# Patient Record
Sex: FEMALE | Race: WHITE
[De-identification: names, ages, dates, MRNs, and addresses within clinical notes are randomized per-mention and may not be internally consistent; named-entity substitution may affect disease eponyms.]

---

## 2017-06-28 ENCOUNTER — HOSPITAL ENCOUNTER (OUTPATIENT)
Dept: HOSPITAL 47 - RADUSWWP | Age: 52
Discharge: HOME | End: 2017-06-28
Payer: COMMERCIAL

## 2017-06-28 DIAGNOSIS — E04.2: Primary | ICD-10-CM

## 2017-06-28 PROCEDURE — 84439 ASSAY OF FREE THYROXINE: CPT

## 2017-06-28 PROCEDURE — 76536 US EXAM OF HEAD AND NECK: CPT

## 2017-06-28 PROCEDURE — 84443 ASSAY THYROID STIM HORMONE: CPT

## 2017-06-29 NOTE — US
EXAMINATION TYPE: US thyroid st tissue head/neck

 

DATE OF EXAM: 6/28/2017

 

COMPARISON: NONE

 

CLINICAL HISTORY: E04.2 thyroid goiter; left thyroid nodule per order; patient c/o sore throat

 

GLAND SIZE:

 

Right Lobe: 5.5 x 1.7 x 1.7 cm

** Overall Parenchyma:  homogenous

Left Lobe: 5.3 x 1.8 x 1.9 cm

** Overall Parenchyma:  homogeneous

Isthmus Thickness:  0.4 cm

 

NODULES

 

RIGHT:   # of nodules measured on right: 2

1.  0.4 x 0.3 x 0.2cm hypoechoic solid nodule at the upper pole with well-defined margins.  This nodu
le is wider than tall and shows no intranodular vascularity.

** Prior size: no prior US

2. 0.7 X 0.5  x 0.3 cm hypoechoic solid nodule at the mid pole with well-defined margins.  This nodul
e is wider than tall and shows no intranodular vascularity.

 

 

LEFT:    # of nodules measured on left:  0

 

ISTHMUS:    # of nodules measured in the isthmus:  0

 

Bilateral neck scanned, no evidence of lymphadenopathy.

 

IMPRESSION:

 

1. Subcentimeter right lobe thyroid nodules.

## 2019-01-15 ENCOUNTER — HOSPITAL ENCOUNTER (OUTPATIENT)
Dept: HOSPITAL 47 - RADMAMWWP | Age: 54
Discharge: HOME | End: 2019-01-15
Attending: OBSTETRICS & GYNECOLOGY
Payer: COMMERCIAL

## 2019-01-15 DIAGNOSIS — Z12.31: Primary | ICD-10-CM

## 2019-01-15 PROCEDURE — 77063 BREAST TOMOSYNTHESIS BI: CPT

## 2019-01-15 PROCEDURE — 77067 SCR MAMMO BI INCL CAD: CPT

## 2019-01-19 NOTE — MM
Reason for exam: screening  (asymptomatic).

Last mammogram was performed 2 years and 4 months ago.



History:

Patient is postmenopausal.

Took hormonal contraceptives for 14 years.



MG 3D Screening Mammo W/Cad

Bilateral CC and MLO view(s) were taken.

Prior study comparison: September 2, 2016, bilateral MG screening mammo w CAD.  

May 12, 2015, bilateral MG screening mammo w CAD.

The breast tissue is heterogeneously dense. This may lower the sensitivity of 

mammography.

There are benign-appearing round segmented calcifications in the right breast 

upper outer position.  No significant changes when compared with prior studies.





ASSESSMENT: Benign, BI-RAD 2



RECOMMENDATION:

Routine screening mammogram of both breasts in 1 year.

## 2020-02-19 ENCOUNTER — HOSPITAL ENCOUNTER (OUTPATIENT)
Dept: HOSPITAL 47 - RADMAMWWP | Age: 55
End: 2020-02-19
Attending: OBSTETRICS & GYNECOLOGY
Payer: COMMERCIAL

## 2020-02-19 DIAGNOSIS — R92.8: Primary | ICD-10-CM

## 2020-02-19 PROCEDURE — 77065 DX MAMMO INCL CAD UNI: CPT

## 2020-02-19 PROCEDURE — 77061 BREAST TOMOSYNTHESIS UNI: CPT

## 2020-02-19 NOTE — MM
Reason for exam: additional evaluation requested from abnormal screening.

Last mammogram was performed less than 1 month ago.



History:

Patient is postmenopausal.

Took hormonal contraceptives for 14 years.



Physical Findings:

Nurse did not find any significant physical abnormalities on exam.



MG 3D Work Up W/Cad LT

Spot compression CC, spot compression MLO, and LM view(s) were taken of the left 

breast.

Prior study comparison: February 10, 2020, bilateral MG 3d screening mammo w/cad. 

January 15, 2019, bilateral MG 3d screening mammo w/cad.

The breast tissue is heterogeneously dense. This may lower the sensitivity of 

mammography.  The questioned lateral subareolar focal asymmetry appears to 

disperse on additional views. 6 month follow up recommended as a precautionary 

measure.



These results were verbally communicated with the patient and result sheet given 

to the patient on 2/19/20.





ASSESSMENT: Probably benign, BI-RAD 3



RECOMMENDATION:

Follow-up diagnostic mammogram of the left breast in 6 months.

## 2020-10-07 ENCOUNTER — HOSPITAL ENCOUNTER (OUTPATIENT)
Dept: HOSPITAL 47 - RADMAMWWP | Age: 55
Discharge: HOME | End: 2020-10-07
Attending: OBSTETRICS & GYNECOLOGY
Payer: COMMERCIAL

## 2020-10-07 DIAGNOSIS — R92.8: Primary | ICD-10-CM

## 2020-10-07 PROCEDURE — 77065 DX MAMMO INCL CAD UNI: CPT

## 2020-10-07 PROCEDURE — 77061 BREAST TOMOSYNTHESIS UNI: CPT

## 2020-10-07 NOTE — MM
Reason for exam: follow-up at short interval from prior study.

Last mammogram was performed 8 months ago.



History:

Patient is postmenopausal.

Took hormonal contraceptives for 14 years.



Physical Findings:

Nurse did not find any significant physical abnormalities on exam.



MG 3D Diag Mammo W/Cad LT

CC and MLO view(s) were taken of the left breast.

Prior study comparison: February 19, 2020, left breast MG 3d work up w/cad LT.  

February 10, 2020, bilateral MG 3d screening mammo w/cad.

There are scattered fibroglandular densities.  There is no discrete abnormality.  

No significant changes when compared with prior studies.





ASSESSMENT: Benign, BI-RAD 2



RECOMMENDATION:

Return to routine screening mammogram schedule for both breasts.

## 2021-01-27 ENCOUNTER — HOSPITAL ENCOUNTER (OUTPATIENT)
Dept: HOSPITAL 47 - RADUSWWP | Age: 56
Discharge: HOME | End: 2021-01-27
Attending: FAMILY MEDICINE
Payer: COMMERCIAL

## 2021-01-27 DIAGNOSIS — E04.1: Primary | ICD-10-CM

## 2021-01-27 PROCEDURE — 76536 US EXAM OF HEAD AND NECK: CPT

## 2021-01-28 NOTE — US
EXAMINATION TYPE: US thyroid st tissue head/neck

 

DATE OF EXAM: 1/27/2021

 

COMPARISON: 6/28/2017

 

CLINICAL HISTORY: 55-year-old female E04.2 nontoxic multinodular [id number] nontoxic multinodular go
iter. History nodules, follow up

 

TECHNIQUE: Multiple sonographic images of the thyroid gland are obtained.

 

FINDINGS:

 

GLAND SIZE:

 

Right Lobe: 5.2 x 1.6 x 1.5  cm

** Overall Parenchyma:  homogenous

Left Lobe: 4.8 x 1.7 x 1.8 cm

** Overall Parenchyma:  homogeneous

Isthmus Thickness:  0.4 cm

 

NODULES

 

RIGHT:   # of nodules measured on right: 1

1.  0.7 X 0.5  x 0.4 cm solid or almost completely solid, isoechoic nodule at the mid pole, which is 
wider than tall, with smooth margins, without echogenic foci.

 ** Prior size: 0.7 x 0.5 x 0.3 cm 

 

LEFT:    # of nodules measured on left:  0

 

ISTHMUS:    # of nodules measured in the isthmus:  0

 

Sonographer notes: Bilateral neck scanned, no evidence of lymphadenopathy.

 

 

 

IMPRESSION:

Borderline thyromegaly. Solitary 7 mm solid nodule at the right midpole remain stable from 2017.